# Patient Record
(demographics unavailable — no encounter records)

---

## 2024-11-05 NOTE — REASON FOR VISIT
[Initial Evaluation] : an initial evaluation [DM Type 2] : DM Type 2 [Thyroid nodule/ MNG] : thyroid nodule/ MNG [Hypothyroidism] : hypothyroidism [Weight Management/Obesity] : weight management/obesity

## 2024-11-05 NOTE — HISTORY OF PRESENT ILLNESS
[FreeTextEntry1] : 67 year old man, [place] speaking, with medical h/o obesity (BMI 31), T2DM, MNG and hypothyroidism presented to [facility] care.  Conversation was carried on with assistance from - Jorge [id number].    Labs  Nov 2024- A1C 6.6%, TSH 1.7, Free T4 of 1.3, LDL-C 159, Tg 216 June 2024- A1C 6.3%, TSH 1.7, LDL-C 124, Tg 272, Vit D 23.4  Thyroid US Sep 2024-  rmp- 0.9 x 0.6 x 0.9 cm TR 2 nodule  The other previously seen nodule on rlp was not seen   Thyroid US July 2022-  rmp- 1.3 x 0.8 x 1.2 cm isoechoic nodule TR 3  rlp- 1.2 x 0.9 x 0.8 cm TALLER THAN WIDE, hyperechoic TR 4-- FNA WAS NONDIAGNOSTIC    - T2DM  He was prescribed metformin but never took it due to concern for side effects. He was trying to manage his diabetes with diet adjustment. He took Saxenda for a total of 3 month about 2 years ago. He didn't have any side effects on Saxenda but self discontinued it as it was expensive. Eats a lot of fruits.    - Hypothyroid  Takes Levothyroxine 100 mcg daily.    - Bone Health  Takes vit D ? dose

## 2024-11-05 NOTE — ASSESSMENT
[FreeTextEntry1] : 1. T2DM  Plan:  - Will start Ozempic 0.25 mg once a week.  - Refused CDE consult  - check labs in 3 month   2. Hypothyroid  Plan: - LT4  100 mcg - Will check thyroid profile next appointment   3. MNG rlp nodule, reported on July 2022 US (TALLER THAN WIDE) which was biopsied (nondiagnostic), did not show up on repeat imaging in 2024 Plan: - repeat Thyroid US Sep 2025   4. Obesity Plan:  - Anticipate weight loss on Ozempic   5. Bone health  Takes vit D- ? dose  - will check vit D level   6. Hyperlipidemia  Not on statins  Plan:  - repat lipid panel in 3 month. May show improvement with diet adjustment

## 2025-05-05 NOTE — ASSESSMENT
[FreeTextEntry1] : 1. T2DM  Plan:  - He agreed to apply for saving program for Ozempic.  - Declined trying any other medications for DM management. Wants to have his diabetes managed with diet adjustment. Refused CDE consult. We extensively discussed macro and microvascular side effects of uncontrolled diabetes.  - check labs in 3 month  - Ophthalmology follow up was recommended- last appointment was a year ago   2. Hypothyroid  Plan: - LT4  100 mcg - Will check thyroid profile next appointment   3. MNG rlp nodule, reported on July 2022 US (TALLER THAN WIDE) which was biopsied (nondiagnostic), did not show up on repeat imaging in 2024 Plan: - repeat Thyroid US Sep 2025   4. Obesity Plan:  - Nutritional counselling was provided   5. Bone health  Takes vit D- ? dose  - c/w vit D supplementation   6. Hyperlipidemia  Not on statins  Plan:  - repat lipid panel in 3 month. May show improvement with diet adjustment   Follow up in 3 month

## 2025-05-05 NOTE — HISTORY OF PRESENT ILLNESS
[FreeTextEntry1] : 67 year old man, [place] speaking, with medical h/o obesity (BMI 31), FELTON (Not using CPAP; couldn't afford it), T2DM, MNG and hypothyroidism presented for follow up.  Conversation was carried on with assistance from - Rebel [id number]    Labs  April 2025- A1C 7.7%, eGFR 99, Tg ELEVATED at 297 (labs done after lunch), LDL-C ELEVATED at 122, TSH 2, vit B12 389, Vit D 43.5, PSA ELEVATED at 5 (follows up with urologist) Nov 2024- A1C 6.6%, TSH 1.7, Free T4 of 1.3, LDL-C 159, Tg 216 June 2024- A1C 6.3%, TSH 1.7, LDL-C 124, Tg 272, Vit D 23.4  Thyroid US Sep 2024-  rmp- 0.9 x 0.6 x 0.9 cm TR 2 nodule  The other previously seen nodule on rlp was not seen   Thyroid US July 2022-  rmp- 1.3 x 0.8 x 1.2 cm isoechoic nodule TR 3  rlp- 1.2 x 0.9 x 0.8 cm TALLER THAN WIDE, hyperechoic TR 4-- FNA WAS NONDIAGNOSTIC    - T2DM  He is currently not taking any medications. Tried Ozempic 0.5 mg day for 3 month and then discontinued it due to high price.  He was prescribed metformin but never took it due to concern for side effects.  He took Saxenda for a total of 3 month about 2 years ago. He didn't have any side effects on Saxenda but self-discontinued it as it was expensive. Eats a lot of fruits.    - Hypothyroid  Takes Levothyroxine 100 mcg daily.    - Bone Health  Takes vit D ? dose